# Patient Record
Sex: FEMALE | Race: WHITE | NOT HISPANIC OR LATINO | URBAN - NONMETROPOLITAN AREA
[De-identification: names, ages, dates, MRNs, and addresses within clinical notes are randomized per-mention and may not be internally consistent; named-entity substitution may affect disease eponyms.]

---
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
## 2020-05-07 ENCOUNTER — TELEMEDICINE (OUTPATIENT)
Dept: OBGYN | Age: 20
End: 2020-05-07
Payer: COMMERCIAL

## 2020-05-07 PROCEDURE — 99203 OFFICE O/P NEW LOW 30 MIN: CPT | Performed by: NURSE PRACTITIONER

## 2020-05-07 ASSESSMENT — ENCOUNTER SYMPTOMS
ALLERGIC/IMMUNOLOGIC NEGATIVE: 1
CONSTIPATION: 0
EYES NEGATIVE: 1
DIARRHEA: 0
GASTROINTESTINAL NEGATIVE: 1
RESPIRATORY NEGATIVE: 1

## 2020-05-07 NOTE — PATIENT INSTRUCTIONS
organs. Your doctor may need to surgically remove the device. Call your doctor at once if you have:  · severe cramps or pelvic pain, pain during sexual intercourse;  · extreme dizziness or light-headed feeling;  · severe migraine headache;  · heavy or ongoing vaginal bleeding, vaginal sores, vaginal discharge that is watery, foul-smelling discharge, or otherwise unusual;  · pale skin, weakness, easy bruising or bleeding, fever, chills, or other signs of infection;  · sudden numbness or weakness (especially on one side of the body), confusion, problems with vision, sensitivity to light;  · jaundice (yellowing of the skin or eyes); or  · signs of an allergic reaction: hives; difficulty breathing; swelling of your face, lips, tongue, or throat. Common side effects may include:  · pelvic pain, vaginal itching or infection, irregular menstrual periods, changes in bleeding patterns or flow;  · stomach pain, nausea, vomiting, bloating;  · headache, depression, mood changes;  · back pain, breast tenderness or pain;  · weight gain, acne, changes in hair growth, loss of interest in sex; or  · puffiness in your face, hands, ankles, or feet. This is not a complete list of side effects and others may occur. Call your doctor for medical advice about side effects. You may report side effects to FDA at 8-609-FDA-8713. What other drugs will affect levonorgestrel intrauterine system? Other drugs may interact with levonorgestrel, including prescription and over-the-counter medicines, vitamins, and herbal products. Tell your doctor about all your current medicines and any medicine you start or stop using. Where can I get more information? Your doctor or pharmacist can provide more information about the levonorgestrel intrauterine system. Remember, keep this and all other medicines out of the reach of children, never share your medicines with others, and use this medication only for the indication prescribed.   Every effort has

## 2020-05-07 NOTE — PROGRESS NOTES
Comment: social    Drug use: Yes     Types: Marijuana       PHYSICAL EXAMINATION:  [ INSTRUCTIONS:  \"[x]\" Indicates a positive item  \"[]\" Indicates a negative item  -- DELETE ALL ITEMS NOT EXAMINED]  Vital Signs: (As obtained by patient/caregiver or practitioner observation)    Blood pressure-  Heart rate-    Respiratory rate-    Temperature-  Pulse oximetry-     Constitutional: [x] Appears well-developed and well-nourished [x] No apparent distress      [] Abnormal-   Mental status  [x] Alert and awake  [x] Oriented to person/place/time []Able to follow commands      Eyes:  EOM    [x]  Normal  [] Abnormal-  Sclera  [x]  Normal  [] Abnormal -         Discharge []  None visible  [] Abnormal -    HENT:   [x] Normocephalic, atraumatic. [] Abnormal   [x] Mouth/Throat: Mucous membranes are moist.     External Ears [x] Normal  [] Abnormal-     Neck: [x] No visualized mass     Pulmonary/Chest: [x] Respiratory effort normal.  [x] No visualized signs of difficulty breathing or respiratory distress        [] Abnormal-      Musculoskeletal:   [x] Normal gait with no signs of ataxia         [x] Normal range of motion of neck        [] Abnormal-       Neurological:        [x] No Facial Asymmetry (Cranial nerve 7 motor function) (limited exam to video visit)          [x] No gaze palsy        [] Abnormal-         Skin:        [x] No significant exanthematous lesions or discoloration noted on facial skin         [] Abnormal-            Psychiatric:       [x] Normal Affect [x] No Hallucinations        [] Abnormal-     Other pertinent observable physical exam findings-     ASSESSMENT/PLAN:  1. Encounter to establish care    2. Pelvic pain    3. Intrauterine device surveillance    Discussed potential etiologies with pt understanding. Possibly bowel or ovary related. Will do pelvic u/s and f/u. Will do OTC stool softener or laxative. Return if symptoms worsen or fail to improve.     Randal Choi is a 20 y.o. female being evaluated by a Virtual Visit (video visit) encounter to address concerns as mentioned above. A caregiver was present when appropriate. Due to this being a TeleHealth encounter (During SBL-42 public health emergency), evaluation of the following organ systems was limited: Vitals/Constitutional/EENT/Resp/CV/GI//MS/Neuro/Skin/Heme-Lymph-Imm. Pursuant to the emergency declaration under the [address] [place] [address] and the [employer] and [employer] Act, this Virtual Visit was conducted with patient's (and/or legal guardian's) consent, to reduce the patient's risk of exposure to COVID-19 and provide necessary medical care. The patient (and/or legal guardian) has also been advised to contact this office for worsening conditions or problems, and seek emergency medical treatment and/or call 911 if deemed necessary. Patient identification was verified at the start of the visit: Yes    Total time spent on this encounter: 30 minutes    Services were provided through a video synchronous discussion virtually to substitute for in-person clinic visit. Patient and provider were located at their individual homes. --Razia Hooper, ELIOT - CNP on 5/7/2020 at 10:45 AM    An electronic signature was used to authenticate this note.

## 2020-05-13 ENCOUNTER — HOSPITAL ENCOUNTER (OUTPATIENT)
Dept: ULTRASOUND IMAGING | Age: 20
Discharge: HOME | End: 2020-05-13
Payer: COMMERCIAL

## 2020-05-13 PROCEDURE — 76830 TRANSVAGINAL US NON-OB: CPT

## 2023-12-26 ENCOUNTER — HOSPITAL ENCOUNTER (EMERGENCY)
Facility: HOSPITAL | Age: 23
Discharge: LEFT BEFORE DISCHARGE | End: 2023-12-26
Payer: MEDICAID

## 2023-12-26 PROCEDURE — 99211 OFF/OP EST MAY X REQ PHY/QHP: CPT
